# Patient Record
Sex: MALE | Race: WHITE | NOT HISPANIC OR LATINO | Employment: PART TIME | URBAN - METROPOLITAN AREA
[De-identification: names, ages, dates, MRNs, and addresses within clinical notes are randomized per-mention and may not be internally consistent; named-entity substitution may affect disease eponyms.]

---

## 2021-09-01 ENCOUNTER — OFFICE VISIT (OUTPATIENT)
Dept: URGENT CARE | Facility: URGENT CARE | Age: 26
End: 2021-09-01

## 2021-09-01 VITALS
SYSTOLIC BLOOD PRESSURE: 131 MMHG | DIASTOLIC BLOOD PRESSURE: 76 MMHG | WEIGHT: 135 LBS | RESPIRATION RATE: 20 BRPM | HEART RATE: 112 BPM

## 2021-09-01 DIAGNOSIS — R51.9 ACUTE INTRACTABLE HEADACHE, UNSPECIFIED HEADACHE TYPE: Primary | ICD-10-CM

## 2021-09-01 PROCEDURE — 99204 OFFICE O/P NEW MOD 45 MIN: CPT | Performed by: PHYSICIAN ASSISTANT

## 2021-09-01 NOTE — PROGRESS NOTES
Chief Complaint   Patient presents with     Migraine     pressure headache with movement, constantly there, x 7 days, and dizziness.        ASSESSMENT/PLAN:  Kayden was seen today for migraine.    Diagnoses and all orders for this visit:    Acute intractable headache, unspecified headache type    Differential diagnosis includes intracranial abnormality, mass, migraine, tension headache, TMJ dysfunction, muscle strain among others.    Given how long patient's headache has lasted, his lack of headache and his history, and the vertigo experienced with head movement I think it best he be evaluated in the emergency room where they can do CT or image his head.  Given his normal neurologic exam, I think it is more likely this is a benign cause such as tension headache or TMJ given he does have some left-sided temporalis and TMJ tenderness on exam of the left side.  However seems strange this would radiate just along the forehead and into the right temple.  Patient agrees to be evaluated in the ER and will travel by private vehicle    Enrrique Torres PA-C  30 minutes spent on the date of the encounter doing chart review, history and exam, documentation and further activities per the note    SUBJECTIVE:  Kayden is a 26 year old male who presents to urgent care with 7 days of a constant headache.  Can get worse at times.  Is mainly in the left temple and forehead but will radiate to the right side and temple.  He feels worse with head movements and also feels dizzy at times when he moves his head.  He does not have a significant history of headaches but his brother and father do have migraines.  He denies any vision changes, hearing loss.  Sometimes he feels off balance but has not noticed that significantly.  No fever or chills.  No sore throat, shortness of breath, chest pain, cough, nausea, vomiting, diarrhea or abdominal pain.  No head trauma.    ROS: Pertinent ROS neg other than the symptoms noted above in the HPI.      OBJECTIVE:  /76 (BP Location: Left arm, Patient Position: Sitting, Cuff Size: Adult Regular)   Pulse 112   Resp 20   Wt 61.2 kg (135 lb)    GENERAL: healthy, alert and no distress  EYES: Eyes grossly normal to inspection, PERRL and conjunctivae and sclerae normal  HENT: ear canals and TM's normal, nose and mouth without ulcers or lesions.  Tenderness at the left TMJ and temporalis muscle  NECK: no adenopathy, no asymmetry, masses, or scars and thyroid normal to palpation  RESP: lungs clear to auscultation - no rales, rhonchi or wheezes  CV: regular rate and rhythm, normal S1 S2, no S3 or S4, no murmur, click or rub, no peripheral edema and peripheral pulses strong  MS: no gross musculoskeletal defects noted, no edema  SKIN: no suspicious lesions or rashes  NEURO: Normal strength and tone, mentation intact and speech normal, cranial nerves II through XII intact, no nystagmus, finger-to-nose normal, rapid alternating hand movements normal, Romberg negative    DIAGNOSTICS    No results found for any visits on 09/01/21.     No current outpatient medications on file.     No current facility-administered medications for this visit.      There is no problem list on file for this patient.     No past medical history on file.  No past surgical history on file.  No family history on file.  Social History     Tobacco Use     Smoking status: Not on file   Substance Use Topics     Alcohol use: Not on file              The plan of care was discussed with the patient. They understand and agree with the course of treatment prescribed. A printed summary was given including instructions and medications.  The use of Dragon/[employer]mer[employer] dictation services may have been used to construct the content in this note; any grammatical or spelling errors are non-intentional. Please contact the author of this note directly if you are in need of any clarification.

## 2021-10-17 ENCOUNTER — HEALTH MAINTENANCE LETTER (OUTPATIENT)
Age: 26
End: 2021-10-17

## 2022-10-03 ENCOUNTER — HEALTH MAINTENANCE LETTER (OUTPATIENT)
Age: 27
End: 2022-10-03

## 2023-02-11 ENCOUNTER — HEALTH MAINTENANCE LETTER (OUTPATIENT)
Age: 28
End: 2023-02-11

## 2024-03-09 ENCOUNTER — HEALTH MAINTENANCE LETTER (OUTPATIENT)
Age: 29
End: 2024-03-09